# Patient Record
Sex: MALE | NOT HISPANIC OR LATINO | URBAN - METROPOLITAN AREA
[De-identification: names, ages, dates, MRNs, and addresses within clinical notes are randomized per-mention and may not be internally consistent; named-entity substitution may affect disease eponyms.]

---

## 2018-04-03 ENCOUNTER — IMPORTED ENCOUNTER (OUTPATIENT)
Dept: URBAN - METROPOLITAN AREA CLINIC 1 | Facility: CLINIC | Age: 83
End: 2018-04-03

## 2018-04-03 PROBLEM — H35.372: Noted: 2018-04-03

## 2018-04-03 PROBLEM — H43.813: Noted: 2018-04-03

## 2018-04-03 PROBLEM — Z96.1: Noted: 2018-04-03

## 2018-04-03 PROBLEM — H47.013: Noted: 2018-04-03

## 2018-04-03 PROBLEM — H35.033: Noted: 2018-04-03

## 2018-04-03 PROBLEM — H04.123: Noted: 2018-04-03

## 2018-04-03 PROBLEM — H16.143: Noted: 2018-04-03

## 2018-04-03 PROCEDURE — 92015 DETERMINE REFRACTIVE STATE: CPT

## 2018-04-03 PROCEDURE — 92014 COMPRE OPH EXAM EST PT 1/>: CPT

## 2018-04-03 NOTE — PATIENT DISCUSSION
1.  Epiretinal Membrane OS- Stable. Appears benign. Observe for change. 2. CASSIUS w/ increased PEK OU- Progressed OU. The increase of artificial tears OU to TID were recommended. 3.  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 PVD OU- Old stable. 5.  1200 [address] Zoë Mcfadden. H/o Ocular Migraines7. Return for an appointment for a 27 in 1 year with Dr. Rupali Headley.

## 2019-03-22 ENCOUNTER — IMPORTED ENCOUNTER (OUTPATIENT)
Dept: URBAN - METROPOLITAN AREA CLINIC 1 | Facility: CLINIC | Age: 84
End: 2019-03-22

## 2019-03-22 PROBLEM — H35.033: Noted: 2019-03-22

## 2019-03-22 PROBLEM — H47.013: Noted: 2019-03-22

## 2019-03-22 PROBLEM — H16.143: Noted: 2019-03-22

## 2019-03-22 PROBLEM — H04.123: Noted: 2019-03-22

## 2019-03-22 PROBLEM — H43.813: Noted: 2019-03-22

## 2019-03-22 PROBLEM — Z96.1: Noted: 2019-03-22

## 2019-03-22 PROBLEM — H35.372: Noted: 2019-03-22

## 2019-03-22 PROCEDURE — 92015 DETERMINE REFRACTIVE STATE: CPT

## 2019-03-22 PROCEDURE — 92014 COMPRE OPH EXAM EST PT 1/>: CPT

## 2019-03-22 NOTE — PATIENT DISCUSSION
1.  Epiretinal Membrane OS - Stable. Observe for change. 2. CASSIUS w/ PEK OU- Recommend ATs TID OU routinely 3. Pseudophakia OU - Doing Well 4. 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 PVD OU - RD precautions. 6.  H/o Ocular Migraines MRX for glasses givenReturn for an appointment in 6 months 10/DFE/check ks with Dr. Alejandrina Wetzel.

## 2019-06-06 ENCOUNTER — IMPORTED ENCOUNTER (OUTPATIENT)
Dept: URBAN - METROPOLITAN AREA CLINIC 1 | Facility: CLINIC | Age: 84
End: 2019-06-06

## 2019-06-06 NOTE — PATIENT DISCUSSION
**NO 10 performed today patient only here for DMV VF and testing/DMV form filled out and given to patient. F/u as scheduled. **

## 2021-02-15 ENCOUNTER — IMPORTED ENCOUNTER (OUTPATIENT)
Dept: URBAN - METROPOLITAN AREA CLINIC 1 | Facility: CLINIC | Age: 86
End: 2021-02-15

## 2021-02-15 PROBLEM — H35.3131: Noted: 2021-02-15

## 2021-02-15 PROBLEM — H16.143: Noted: 2021-02-15

## 2021-02-15 PROBLEM — H04.123: Noted: 2021-02-15

## 2021-02-15 PROBLEM — H35.372: Noted: 2021-02-15

## 2021-02-15 PROCEDURE — 99214 OFFICE O/P EST MOD 30 MIN: CPT

## 2021-02-15 NOTE — PATIENT DISCUSSION
1.  ARMD OU Early/dry/stable. Importance of daily AREDS II study multivitamin and Amsler Grid checks discussed with patient. Patient to follow-up immediately with any new onset of decreased vision and/or metamorphopsia. 2. Epiretinal Membrane OS - Observe for change. 3. CASSIUS w/ PEK OU- Recommend increase ATs QID-Q2H OU routinely. Consider Restasis without improvement 4. Pseudophakia OU - Doing Well 5. 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 PVD OU - RD precautions. 7.  H/o Ocular MigrainesReturn for an appointment in 3 months 10 with Dr. Jaky Rodrigues.

## 2021-03-08 ENCOUNTER — IMPORTED ENCOUNTER (OUTPATIENT)
Dept: URBAN - METROPOLITAN AREA CLINIC 1 | Facility: CLINIC | Age: 86
End: 2021-03-08

## 2021-03-08 PROBLEM — H04.123: Noted: 2021-03-08

## 2021-03-08 PROBLEM — H16.143: Noted: 2021-03-08

## 2021-03-08 PROBLEM — S05.01XA: Noted: 2021-03-08

## 2021-03-08 PROCEDURE — 99213 OFFICE O/P EST LOW 20 MIN: CPT

## 2021-03-08 NOTE — PATIENT DISCUSSION
1.  Corneal Abrasion OD -- From prior Corneal FB; no longer present. May increase frequency of OTC ATs Q1h OD. 2.  CASSIUS w/ PEK OU -- PEK improved. Continue OTC ATs QID OU routinely. 3.  Pseudophakia OU- Stable. 4.  Ocular Migraine- Reassurance and explanation was given to patient. 5. H/o Epiretinal Membrane OS 6. H/o Hypertensive Retinopathy OU 7. H/o PVD OU Return for an appointment for As scheduled for a 30 with Dr. Karlee Licea.

## 2021-05-17 ENCOUNTER — IMPORTED ENCOUNTER (OUTPATIENT)
Dept: URBAN - METROPOLITAN AREA CLINIC 1 | Facility: CLINIC | Age: 86
End: 2021-05-17

## 2021-05-17 PROBLEM — H35.372: Noted: 2021-05-17

## 2021-05-17 PROBLEM — H16.143: Noted: 2021-05-17

## 2021-05-17 PROBLEM — H35.3131: Noted: 2021-05-17

## 2021-05-17 PROBLEM — H04.123: Noted: 2021-05-17

## 2021-05-17 PROCEDURE — 99214 OFFICE O/P EST MOD 30 MIN: CPT

## 2021-05-17 NOTE — PATIENT DISCUSSION
1.  ARMD OU Early/dry/stable. Importance of daily AREDS II study multivitamin and Amsler Grid checks discussed with patient. Patient to follow-up immediately with any new onset of decreased vision and/or metamorphopsia. 2. Epiretinal Membrane OS - Observe for change. 3. CASSIUS w/ PEK OU- Recommend ATs QID OU routinely4. Pseudophakia OU- Doing Well  5. Hypertensive Retinopathy OU- Controlled. 6. PVD OU - RD precautionsReturn for an appointment in 6 months DFE with Dr. Nicol Lundberg.

## 2021-11-05 ENCOUNTER — IMPORTED ENCOUNTER (OUTPATIENT)
Dept: URBAN - METROPOLITAN AREA CLINIC 1 | Facility: CLINIC | Age: 86
End: 2021-11-05

## 2021-11-05 PROBLEM — H35.372: Noted: 2021-11-05

## 2021-11-05 PROBLEM — H35.3131: Noted: 2021-11-05

## 2021-11-05 PROCEDURE — 99213 OFFICE O/P EST LOW 20 MIN: CPT

## 2021-11-05 NOTE — PATIENT DISCUSSION
1.  ARMD OU Early/dry/stable. Importance of daily AREDS II study multivitamin and Amsler Grid checks discussed with patient. Patient to follow-up immediately with any new onset of decreased vision and/or metamorphopsia. 2. Epiretinal Membrane OS - Observe for change. 3. CASSIUS w/ PEK OU- Recommend ATs QID OU routinely4. Pseudophakia OU- Doing Well  5. Hypertensive Retinopathy OU- Controlled. 6. PVD OU - RD precautions7. Papilloma RLL - Benign. Return for an appointment in 6 months 27 with Dr. Vargas Marmora.

## 2022-03-12 ASSESSMENT — VISUAL ACUITY
OD_CC: 20/25
OS_SC: 20/40
OS_SC: 20/50
OD_SC: 20/20
OD_GLARE: 20/200
OD_GLARE: 20/50
OS_CC: 20/40
OD_CC: 20/50
OD_SC: 20/25
OS_SC: 20/40
OS_SC: 20/40
OS_GLARE: 20/80
OS_CC: 20/50-2
OD_SC: 20/20-1
OS_CC: 20/60
OS_CC: 20/50
OD_SC: 20/25
OD_CC: 20/40
OS_GLARE: 20/200
OS_GLARE: 20/100
OD_GLARE: 20/100
OD_CC: 20/40

## 2022-03-12 ASSESSMENT — TONOMETRY
OS_IOP_MMHG: 16
OD_IOP_MMHG: 14
OS_IOP_MMHG: 15
OD_IOP_MMHG: 16
OD_IOP_MMHG: 16
OS_IOP_MMHG: 16
OD_IOP_MMHG: 15
OD_IOP_MMHG: 16
OD_IOP_MMHG: 14
OS_IOP_MMHG: 16
OS_IOP_MMHG: 15
OS_IOP_MMHG: 17